# Patient Record
Sex: MALE | URBAN - METROPOLITAN AREA
[De-identification: names, ages, dates, MRNs, and addresses within clinical notes are randomized per-mention and may not be internally consistent; named-entity substitution may affect disease eponyms.]

---

## 2022-12-06 ENCOUNTER — OFFICE VISIT (OUTPATIENT)
Dept: URBAN - METROPOLITAN AREA CLINIC 5 | Facility: CLINIC | Age: 47
End: 2022-12-06
Payer: COMMERCIAL

## 2022-12-06 DIAGNOSIS — H17.821 PERIPHERAL OPACITY OF CORNEA OF RIGHT EYE: ICD-10-CM

## 2022-12-06 DIAGNOSIS — H31.091 CHORIORETINAL SCARS, RIGHT EYE: Primary | ICD-10-CM

## 2022-12-06 PROCEDURE — 92004 COMPRE OPH EXAM NEW PT 1/>: CPT | Performed by: OPTOMETRIST

## 2022-12-06 ASSESSMENT — INTRAOCULAR PRESSURE
OD: 14
OS: 11

## 2022-12-06 NOTE — IMPRESSION/PLAN
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 Plan: Single round chorioretinal scar w/ pigmentation appears flat. Pt ed on S/S of RD and told to RTC if symptomatic. Continue to monitor.

## 2022-12-06 NOTE — IMPRESSION/PLAN
Impression: Peripheral opacity of cornea of right eye: H17.821. Plan: Pt has small cornea scar inferotemporally right eye.